# Patient Record
(demographics unavailable — no encounter records)

---

## 2024-11-12 NOTE — PHYSICAL EXAM
[FreeTextEntry1] : Pleasant, in no distress. Language: English HEENT: Head: no trauma. Eyes: no discharge. Ears: No discharge. Nose No discharge. Throat: clear Neck: AROM.  0-60.  Spasms in the bilateral paracervical musculature.  Negative Spurlings Heart: RR, +S1, S2 Lungs: CTA Abdomen: soft, NT Lumbar spine: AROM 10-70.,  Bilateral paraspinal spasms from T2-L2.  LUE: Shoulder: AROM abduction/forward flexion 0-90, positive impingement MS 4/5 Elbow: FAROM, MS 5/5 reflexes 2/4 Wrist: FAROM, MS 5/5 reflexes 2/4 Warm, nontender, pulse 2+  RUE:Shoulder:FAROM, MS 5-/5 positive impingement Elbow: FAROM, MS 5/5 reflexes 2/4 Wrist: FAROM, MS 5/5 reflexes 2/4 Warm, nontender, pulse 2+  LLE: Hip: FAROM, MS 5/5 Knee: FAROM, MS 4/5 reflexes 2/4 lateral tracking of the patella.  Positive medial meniscus.  Positive grind. Ankle: FAROM, MS 5/5 reflexes 2/4 Warm , nontender, pulse 2+ negative homans  RLE: Hip: FAROM, MS 5/5 Knee: FAROM, MS 5-/5 lateral tracking of the patella reflexes 2/4 Ankle: FAROM, MS 5/5 reflexes 2/4 Warm , nontender, pulse 2+ negative homans  Gait: Spontaneous, reciprocal, safe without an assistive device  Sensation RUE: sensation is intact to light touch, pinprick  and proprioception LUE: sensation is intact to light touch, pinprick  and proprioception RLE: sensation is intact to light touch, pinprick  and proprioception. Neg SLR. Neg WILLY, Neg FADIR LLE: sensation is intact to light touch, pinprick  and proprioception. Neg SLR. Neg WILLY, Neg FADIR

## 2024-11-12 NOTE — HISTORY OF PRESENT ILLNESS
[FreeTextEntry1] : PCP Dr Detweiler Reason: left shoulder. left knee and lower back.pain.  37 year old  RH  male presents with left shoulder, left knee pain,  and back  Left shoulder pain Pain:  9/10 Worse: 10/10 Quality: sharp  Frequency: constant The pain starts in the front of the left shoulder for 1 year ago  He has been under the care of [facility] orthopedics--->steroid injection---> MRI reveals a Left RTC tear. Dr Romero recommended surgery. He preferred physical therapy The pain is worse in the morning. The pain starts in the left shoulder and radiates to the left elbow. Pain is worse with abduction of the arm.   Left knee pain Pain:  3/10 Worse: 10/10 Quality: sharp  Frequency: constant The pain is aggravated with hanging without weight Pain does not hurt with stairs and ambulation. MRI of the left Knee in 2010. Tear in the medial meniscus Last PT in 2021  Low back pain Pain:  8/10 Worse: 10/10 Quality: burning Frequency: constant The pain starts in the low back and radiates to the feet. The pain is worse with bending forward and lifting laying prone increases his pain.   PMHX None  PSHX none  Allergies: none

## 2024-11-12 NOTE — DATA REVIEWED
[FreeTextEntry1] : MRI of the left shoulder performed on August 31, 2023 reveals extensive superior surface partial tear of the supraspinatus tendon.  Accompanying trace subdeltoid bursitis.  Minimal long head biceps tenosynovitis.  No capsule labral abnormality is visualized.  MRI of the left knee performed on April 18, 2019 reveal very mild chondromalacia patella without focal chondral defect.  Mild lateral patella tilt.  No discrete meniscal tear.  Semimembranosus insertional tendinosis.  Mild semimembranosus and Pes anserine bursitis.  MRI of the lumbar spine performed on April 18, 2019 Small disc bulge at L4/L5 without canal or neuroforaminal stenosis Small central disc protrusion at L5/S1 without canal or foraminal stenosis.  Multilevel facet arthrosis

## 2025-04-09 NOTE — HISTORY OF PRESENT ILLNESS
[FreeTextEntry1] : PCP Dr Detweiler Reason: left shoulder. left knee and lower back pain.  37 year old  RH  male presents with left shoulder, left knee pain,  and back The patient was not able to attend restorative physical therapies over the last month due to his very busy work schedule.  He has been trying to perform a self range of motion exercise program.  His pains are only slightly better.  Left shoulder pain Pain:  4/10 Worse: 10/10 Quality: sharp  Frequency: constant The pain starts in the front of the left shoulder for 1 year ago  He has been under the care of [facility] orthopedics--->steroid injection---> MRI reveals a Left RTC tear. Dr Romero recommended surgery. He preferred physical therapy The pain is worse in the morning. The pain starts in the left shoulder and radiates to the left elbow. Pain is worse with abduction of the arm.  He has difficulty with reaching behind him.  Left knee pain Pain:  3--->4/10 Worse: 10/10 Quality: sharp  Frequency: constant The pain is aggravated with hanging without weight Pain does not hurt with stairs and ambulation. MRI of the left Knee in 2010. Tear in the medial meniscus Last PT in 2021  Low back pain Pain:  8--->5/10 Worse: 10/10 Quality: burning Frequency: constant The pain starts in the low back and radiates to the feet. The pain is worse with bending forward and lifting laying prone increases his pain.  He denies difficulties with bowel or bladder.  He denies leg weakness.  He takes diclofenac only when absolutely necessary.  He uses gabapentin nightly.  He uses Flexeril 2 times a day.  It does not make him tired.  He is able to perform his job and drive without any difficulties.  PMHX None  PSHX none  Allergies: none

## 2025-06-03 NOTE — HISTORY OF PRESENT ILLNESS
[FreeTextEntry1] : PCP Dr Detweiler Reason: left shoulder. left knee and lower back pain.  37 year old  RH  male presents with left shoulder, left knee pain,  and back The patient was not able to attend restorative physical therapies over the last month due to his very busy work schedule.  He has been trying to perform a self range of motion exercise program.  His pains are only slightly better.  He went to therapy for 2 x weeks. They have been working on the shoulder.   Left shoulder pain Pain:  4----> 6/10 Worse: 10/10 Quality: sharp  Frequency: constant The pain starts in the front of the left shoulder for 1 year ago  He has been under the care of [facility] orthoped[facility]--->steroid injection---> MRI reveals a Left RTC tear. Dr Romero recommended surgery. He preferred physical therapy The pain is worse in the morning. The pain starts in the left shoulder and radiates to the left elbow. Pain is worse with abduction of the arm.  He has difficulty with reaching behind him.  Left knee pain Pain:  4--->2/10 Worse: 10/10 Quality: sharp  Frequency: constant The pain is aggravated with hanging without weight Pain does not hurt with stairs and ambulation. MRI of the left Knee in 2010. Tear in the medial meniscus Last PT in 2021  Low back pain Pain:  5-->4/10 Worse: 10/10 Quality: burning Frequency: constant The pain starts in the low back and radiates to the feet. The pain is worse with bending forward and lifting laying prone increases his pain.  He denies difficulties with bowel or bladder.  He denies leg weakness.  He takes diclofenac only when absolutely necessary.  He uses gabapentin nightly.  He uses Flexeril 2 times a day.  It does not make him tired.  He is able to perform his job and drive without any difficulties.  PMHX None  PSHX none  Allergies: none